# Patient Record
Sex: MALE | Race: OTHER | HISPANIC OR LATINO | URBAN - METROPOLITAN AREA
[De-identification: names, ages, dates, MRNs, and addresses within clinical notes are randomized per-mention and may not be internally consistent; named-entity substitution may affect disease eponyms.]

---

## 2021-07-06 ENCOUNTER — EMERGENCY (EMERGENCY)
Facility: HOSPITAL | Age: 41
LOS: 1 days | Discharge: HOME | End: 2021-07-06
Attending: EMERGENCY MEDICINE
Payer: MEDICAID

## 2021-07-06 VITALS
HEART RATE: 78 BPM | RESPIRATION RATE: 21 BRPM | TEMPERATURE: 98 F | DIASTOLIC BLOOD PRESSURE: 70 MMHG | SYSTOLIC BLOOD PRESSURE: 103 MMHG | OXYGEN SATURATION: 98 %

## 2021-07-06 VITALS
SYSTOLIC BLOOD PRESSURE: 99 MMHG | WEIGHT: 162.92 LBS | TEMPERATURE: 98 F | DIASTOLIC BLOOD PRESSURE: 63 MMHG | HEIGHT: 62.99 IN | OXYGEN SATURATION: 96 % | RESPIRATION RATE: 20 BRPM | HEART RATE: 91 BPM

## 2021-07-06 LAB
ALBUMIN SERPL ELPH-MCNC: 3.2 G/DL — LOW (ref 3.5–5)
ALP SERPL-CCNC: 98 U/L — SIGNIFICANT CHANGE UP (ref 40–120)
ALT FLD-CCNC: 79 U/L DA — HIGH (ref 10–60)
ANION GAP SERPL CALC-SCNC: 8 MMOL/L — SIGNIFICANT CHANGE UP (ref 5–17)
AST SERPL-CCNC: 33 U/L — SIGNIFICANT CHANGE UP (ref 10–40)
BASOPHILS # BLD AUTO: 0 K/UL — SIGNIFICANT CHANGE UP (ref 0–0.2)
BASOPHILS NFR BLD AUTO: 0 % — SIGNIFICANT CHANGE UP (ref 0–2)
BILIRUB SERPL-MCNC: 0.5 MG/DL — SIGNIFICANT CHANGE UP (ref 0.2–1.2)
BUN SERPL-MCNC: 14 MG/DL — SIGNIFICANT CHANGE UP (ref 7–18)
CALCIUM SERPL-MCNC: 8.8 MG/DL — SIGNIFICANT CHANGE UP (ref 8.4–10.5)
CHLORIDE SERPL-SCNC: 106 MMOL/L — SIGNIFICANT CHANGE UP (ref 96–108)
CO2 SERPL-SCNC: 28 MMOL/L — SIGNIFICANT CHANGE UP (ref 22–31)
CREAT SERPL-MCNC: 0.87 MG/DL — SIGNIFICANT CHANGE UP (ref 0.5–1.3)
D DIMER BLD IA.RAPID-MCNC: <150 NG/ML DDU — SIGNIFICANT CHANGE UP
EOSINOPHIL # BLD AUTO: 0.14 K/UL — SIGNIFICANT CHANGE UP (ref 0–0.5)
EOSINOPHIL NFR BLD AUTO: 2 % — SIGNIFICANT CHANGE UP (ref 0–6)
GLUCOSE SERPL-MCNC: 112 MG/DL — HIGH (ref 70–99)
HCT VFR BLD CALC: 41.4 % — SIGNIFICANT CHANGE UP (ref 39–50)
HGB BLD-MCNC: 13.7 G/DL — SIGNIFICANT CHANGE UP (ref 13–17)
LYMPHOCYTES # BLD AUTO: 2.24 K/UL — SIGNIFICANT CHANGE UP (ref 1–3.3)
LYMPHOCYTES # BLD AUTO: 33 % — SIGNIFICANT CHANGE UP (ref 13–44)
MCHC RBC-ENTMCNC: 28.9 PG — SIGNIFICANT CHANGE UP (ref 27–34)
MCHC RBC-ENTMCNC: 33.1 GM/DL — SIGNIFICANT CHANGE UP (ref 32–36)
MCV RBC AUTO: 87.3 FL — SIGNIFICANT CHANGE UP (ref 80–100)
MONOCYTES # BLD AUTO: 0.61 K/UL — SIGNIFICANT CHANGE UP (ref 0–0.9)
MONOCYTES NFR BLD AUTO: 9 % — SIGNIFICANT CHANGE UP (ref 2–14)
NEUTROPHILS # BLD AUTO: 2.51 K/UL — SIGNIFICANT CHANGE UP (ref 1.8–7.4)
NEUTROPHILS NFR BLD AUTO: 37 % — LOW (ref 43–77)
PLATELET # BLD AUTO: 448 K/UL — HIGH (ref 150–400)
POTASSIUM SERPL-MCNC: 3.9 MMOL/L — SIGNIFICANT CHANGE UP (ref 3.5–5.3)
POTASSIUM SERPL-SCNC: 3.9 MMOL/L — SIGNIFICANT CHANGE UP (ref 3.5–5.3)
PROT SERPL-MCNC: 7.2 G/DL — SIGNIFICANT CHANGE UP (ref 6–8.3)
RBC # BLD: 4.74 M/UL — SIGNIFICANT CHANGE UP (ref 4.2–5.8)
RBC # FLD: 12.8 % — SIGNIFICANT CHANGE UP (ref 10.3–14.5)
SARS-COV-2 RNA SPEC QL NAA+PROBE: DETECTED
SODIUM SERPL-SCNC: 142 MMOL/L — SIGNIFICANT CHANGE UP (ref 135–145)
TROPONIN I SERPL-MCNC: <0.015 NG/ML — SIGNIFICANT CHANGE UP (ref 0–0.04)
WBC # BLD: 6.78 K/UL — SIGNIFICANT CHANGE UP (ref 3.8–10.5)
WBC # FLD AUTO: 6.78 K/UL — SIGNIFICANT CHANGE UP (ref 3.8–10.5)

## 2021-07-06 PROCEDURE — 94640 AIRWAY INHALATION TREATMENT: CPT

## 2021-07-06 PROCEDURE — 80053 COMPREHEN METABOLIC PANEL: CPT

## 2021-07-06 PROCEDURE — 84484 ASSAY OF TROPONIN QUANT: CPT

## 2021-07-06 PROCEDURE — 87635 SARS-COV-2 COVID-19 AMP PRB: CPT

## 2021-07-06 PROCEDURE — 85025 COMPLETE CBC W/AUTO DIFF WBC: CPT

## 2021-07-06 PROCEDURE — 93005 ELECTROCARDIOGRAM TRACING: CPT

## 2021-07-06 PROCEDURE — 99285 EMERGENCY DEPT VISIT HI MDM: CPT

## 2021-07-06 PROCEDURE — 71046 X-RAY EXAM CHEST 2 VIEWS: CPT | Mod: 26

## 2021-07-06 PROCEDURE — 85379 FIBRIN DEGRADATION QUANT: CPT

## 2021-07-06 PROCEDURE — 36415 COLL VENOUS BLD VENIPUNCTURE: CPT

## 2021-07-06 PROCEDURE — 99283 EMERGENCY DEPT VISIT LOW MDM: CPT | Mod: 25

## 2021-07-06 PROCEDURE — 71046 X-RAY EXAM CHEST 2 VIEWS: CPT

## 2021-07-06 RX ORDER — IPRATROPIUM/ALBUTEROL SULFATE 18-103MCG
3 AEROSOL WITH ADAPTER (GRAM) INHALATION ONCE
Refills: 0 | Status: COMPLETED | OUTPATIENT
Start: 2021-07-06 | End: 2021-07-06

## 2021-07-06 RX ORDER — ALBUTEROL 90 UG/1
2 AEROSOL, METERED ORAL
Qty: 1 | Refills: 0
Start: 2021-07-06

## 2021-07-06 RX ADMIN — Medication 3 MILLILITER(S): at 21:29

## 2021-07-06 NOTE — ED PROVIDER NOTE - PATIENT PORTAL LINK FT
You can access the FollowMyHealth Patient Portal offered by [facility] by registering at the following website: [email or web address]. By joining [employer]’s FollowMyHealth portal, you will also be able to view your health information using other applications (apps) compatible with our system.

## 2021-07-06 NOTE — ED PROVIDER NOTE - NSFOLLOWUPINSTRUCTIONS_ED_ALL_ED_FT
Log Out.      [employer] CareNotes®     :  [facility]  	                       DYSPNEA - AfterCare(R) Instructions(ER/ED)           Dyspnea    WHAT YOU NEED TO KNOW:    Dyspnea is breathing difficulty or discomfort. You may have labored, painful, or shallow breathing. You may feel breathless or short of breath. Dyspnea can occur during rest or with activity. You may have dyspnea for a short time, or it might become chronic. Dyspnea is often a symptom of a disease or condition.    DISCHARGE INSTRUCTIONS:    Return to the emergency department if:   •Your signs and symptoms are the same or worse within 24 hours of treatment.       •You have shaking chills or a fever over 102°F.       •You have new pain, pressure, or tightness in your chest.       •You have a new or worse cough or wheezing, or you cough up blood.      •You feel like you cannot get enough air.      •The skin over your ribs or on your neck sinks in when you breathe.       •You have a severe headache with vomiting and abdominal pain.       •You feel confused or dizzy.      Contact your healthcare provider or specialist if:   •You have questions or concerns about your condition or care.          Medicines:    •Medicines may be used to treat the cause of your dyspnea. Medicines may reduce swelling in your airway or decrease extra fluid from around your heart or lungs. Other medicines may be used to decrease anxiety and help you feel calm and relaxed.      •Take your medicine as directed. Contact your healthcare provider if you think your medicine is not helping or if you have side effects. Tell him or her if you are allergic to any medicine. Keep a list of the medicines, vitamins, and herbs you take. Include the amounts, and when and why you take them. Bring the list or the pill bottles to follow-up visits. Carry your medicine list with you in case of an emergency.      Manage long-term dyspnea:   •Create an action plan. You and your healthcare provider can work together to create a plan for how to handle episodes of dyspnea. The plan can include daily activities, treatment changes, and what to do if you have severe breathing problems.      •Lean forward on your elbows when you sit. This helps your lungs expand and may make it easier to breathe.      •Use pursed-lip breathing any time you feel short of breath. Breathe in through your nose and then slowly breathe out through your mouth with your lips slightly puckered. It should take you twice as long to breathe out as it did to breathe in.  Breathe in Breathe out           •Do not smoke. Nicotine and other chemicals in cigarettes and cigars can cause lung damage and make it harder to breathe. Ask your healthcare provider for information if you currently smoke and need help to quit. E-cigarettes or smokeless tobacco still contain nicotine. Talk to your healthcare provider before you use these products.       •Reach or maintain a healthy weight. Your healthcare provider can help you create a safe weight loss plan if you are overweight.      •Exercise as directed. Exercise can help your lungs work more easily. Exercise can also help you lose weight if needed. Try to get at least 30 minutes of exercise most days of the week. Your healthcare provider can help you create an exercise plan that is safe for you.      Follow up with your healthcare provider or specialist as directed: Write down your questions so you remember to ask them during your visits.       © Copyright [employer] 2021           back to top                          © Copyright [employer] 2021

## 2021-07-06 NOTE — ED PROVIDER NOTE - RESPIRATORY, MLM
Breath sounds: mild diffuse wheezing, equal and good air movement bilaterally with no respiratory distress, speaking in full sentences.

## 2021-07-06 NOTE — ED PROVIDER NOTE - OBJECTIVE STATEMENT
39 y/o man, no PMH except he tested positive for COVID approximately 6/22/21, then tested negative on Rapid COVID test 6/29/21 at [facility], but since then c/o shortness of breath, chest pain, fatigue.  Cough has improved and fever has resolved.  He denies hemoptysis.  Denies h/o smoking/alcohol/drug use.

## 2021-07-06 NOTE — ED ADULT NURSE NOTE - OBJECTIVE STATEMENT
intermittent chest pain ans sob as per pt ever since he had positive covid test , pt appears comfortable in the stretcher o2 sat is 97

## 2021-07-06 NOTE — ED PROVIDER NOTE - CLINICAL SUMMARY MEDICAL DECISION MAKING FREE TEXT BOX
41 y/o man, no PMH except he tested positive for COVID approximately 6/22/21, then tested negative on Rapid COVID test 6/29/21 at [facility], but since then c/o shortness of breath, chest pain, fatigue; pulse ox is good and no respiratory distress on room air--labs, EKG, CXR, reassess.

## 2021-10-19 ENCOUNTER — EMERGENCY (EMERGENCY)
Facility: HOSPITAL | Age: 41
LOS: 1 days | Discharge: HOME | End: 2021-10-19
Attending: EMERGENCY MEDICINE
Payer: MEDICAID

## 2021-10-19 VITALS
RESPIRATION RATE: 18 BRPM | HEART RATE: 72 BPM | SYSTOLIC BLOOD PRESSURE: 111 MMHG | DIASTOLIC BLOOD PRESSURE: 75 MMHG | TEMPERATURE: 98 F | HEIGHT: 62.99 IN | OXYGEN SATURATION: 97 % | WEIGHT: 143.08 LBS

## 2021-10-19 PROBLEM — Z78.9 OTHER SPECIFIED HEALTH STATUS: Chronic | Status: ACTIVE | Noted: 2021-07-07

## 2021-10-19 PROCEDURE — 99283 EMERGENCY DEPT VISIT LOW MDM: CPT

## 2021-10-19 RX ORDER — FLUORESCEIN SODIUM 9 MG
1 STRIP OPHTHALMIC (EYE) ONCE
Refills: 0 | Status: COMPLETED | OUTPATIENT
Start: 2021-10-19 | End: 2021-10-19

## 2021-10-19 RX ORDER — POLYMYXIN B SULF/TRIMETHOPRIM 10000-1/ML
1 DROPS OPHTHALMIC (EYE) ONCE
Refills: 0 | Status: COMPLETED | OUTPATIENT
Start: 2021-10-19 | End: 2021-10-19

## 2021-10-19 RX ORDER — IBUPROFEN 200 MG
600 TABLET ORAL ONCE
Refills: 0 | Status: COMPLETED | OUTPATIENT
Start: 2021-10-19 | End: 2021-10-19

## 2021-10-19 RX ORDER — IBUPROFEN 200 MG
1 TABLET ORAL
Qty: 40 | Refills: 0
Start: 2021-10-19 | End: 2021-10-28

## 2021-10-19 RX ORDER — HYDROCORTISONE 1 %
1 OINTMENT (GRAM) TOPICAL
Qty: 15 | Refills: 0
Start: 2021-10-19 | End: 2021-10-23

## 2021-10-19 RX ADMIN — Medication 1 DROP(S): at 04:45

## 2021-10-19 RX ADMIN — Medication 1 DROP(S): at 05:08

## 2021-10-19 RX ADMIN — Medication 1 APPLICATION(S): at 04:45

## 2021-10-19 RX ADMIN — Medication 600 MILLIGRAM(S): at 05:21

## 2021-10-19 RX ADMIN — Medication 600 MILLIGRAM(S): at 04:51

## 2021-10-19 NOTE — ED ADULT TRIAGE NOTE - CHIEF COMPLAINT QUOTE
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  I have pain in my eyes and have difficulty opening them.  I do not know if I have any vision problem because I cannot open my eyes.

## 2021-10-19 NOTE — ED PROVIDER NOTE - CONTEXT
using light w/o outer glass encasement/work related using light w/o outer glass encasement/known (describe)/work related

## 2021-10-19 NOTE — ED PROVIDER NOTE - OBJECTIVE STATEMENT
42 y/o M, w/ no significant PMHx, presents w/ bilateral eye pain. Patient reports while at work he was using a light w/o outer glass encasement. Patient reports some eye irration on Saturday after work and states he also worked w/ this light from 9:00 AM-5:00 PM yesterday. Patient states he started having severe burning sensation in his eyes, redness, and sensitivity to light. Patient states burning is now so severe he cannot keep his eyes open thus he came to ED. Patient denies any chemical use or direct trauma to eyes. Evaluation was performed in [place]. Patient denies any other complaints. NKDA.

## 2021-10-19 NOTE — ED PROVIDER NOTE - PATIENT PORTAL LINK FT
You can access the FollowMyHealth Patient Portal offered by [facility] by registering at the following website: [email or web address]. By joining [employer]’s FollowMyHealth portal, you will also be able to view your health information using other applications (apps) compatible with our system.

## 2021-10-19 NOTE — ED PROVIDER NOTE - CLINICAL SUMMARY MEDICAL DECISION MAKING FREE TEXT BOX
42 y/o M, w/ no significant PMHx, presents w/ bilateral eye pain. Exam consistent w/ photokeratitis. Given antibiotic eye drops. Patient stable for discharge to follow up w/ ophthalmology as outpatient.

## 2021-10-19 NOTE — ED ADULT NURSE NOTE - OBJECTIVE STATEMENT
pt presents to ED due to eye pain. 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  I have pain in my eyes and have difficulty opening them.  I do not know if I have any vision problem because I cannot open my eyes.". Denies chest pain, sob, nausea and vomiting.

## 2021-10-19 NOTE — ED PROVIDER NOTE - NSFOLLOWUPCLINICS_GEN_ALL_ED_FT
[facility]  Ophthalmology  [address] [place] [address], [facility] [address]  [place] [address]  Phone: [phone]  Fax:

## 2021-10-19 NOTE — ED PROVIDER NOTE - NSFOLLOWUPINSTRUCTIONS_ED_ALL_ED_FT
Apply antibiotic 1 antibiotic drop into each eye every 3 hours for 1 week. Take ibuprofen for pain every 6 hours.  Followup with eye specialist in 1-2 days for reevaluation.    Aplique antibiótico 1 gota de antibiótico en cada chan cada 3 horas clari 1 semana. [place] ibuprofeno para el dolor cada 6 horas.  Seguimiento con un oftalmólogo en 1-2 días para reevaluación.      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 La córnea es la capa ronny de tejido que cubre la parte frontal de de paz chan.    Anatomía del chan         INSTRUCCIONES SOBRE EL TERRY HOSPITALARIA:    Regrese a la adalberto de emergencias si:  •Usted tiene dolor intenso.      •[place] chan goterea michaela o pus.      •[place] visión empeora repentinamente.      Llame a de paz médico u oftalmólogo si:  •Usted tiene dolor después de 2 días de tratamiento.      •[place] visión no regresa a la normalidad.      •Usted tiene preguntas o inquietudes acerca de de paz condición o cuidado.      Medicamentos:Es posible que usted necesite alguno de los siguientes:  •Puede administrarsepodrían administrarse. Pregunte al médico cómo debe isabel yoon medicamento de forma cintron. Algunos medicamentos recetados para el dolor contienen acetaminofén. No tome otros medicamentos que contengan acetaminofén sin consultarlo con de paz médico. Demasiado acetaminofeno puede causar daño al hígado. Los medicamentos recetados para el dolor podrían causar estreñimiento. Pregunte a de paz médico kojo prevenir o tratar estreñimiento.      •Es posible que le administren gotas para los ojospuede administrarse para aliviar el dolor. Use las gotas meka kojo le indiquen.      •Los antibióticossirven para prevenir o tratar alexander infección en los ojos causada por bacterias. Puede que se administre en gotas oculares o ungüento.      •Los medicamentos ciclopléjicosdilatan la pupila y relajan los músculos del chan. [place] ayudará a disminuir el dolor.      •[place] abdiaziz medicamentos kojo se le haya indicado.Consulte con de paz médico si usted meka que de paz medicamento no le está ayudando o si presenta efectos secundarios. Infórmele si es alérgico a cualquier medicamento. Mantenga alexander lista actualizada de los medicamentos, las vitaminas y los productos herbales que allyssa. Incluya los siguientes datos de los medicamentos: cantidad, frecuencia y motivo de administración. Traiga con usted la lista o los envases de las píldoras a abdiaziz citas de seguimiento. Lleve la lista de los medicamentos con usted en diane de alexander emergencia.      Lágrimas artificiales y ungüento:Las lágrimas artificiales se utilizan para mantener de paz chan húmedo. El ungüento se utiliza para aliviar y proteger de paz chan. [place]oon disminuirá de paz dolor y ayudará a prevenir que de paz párpado se pegue a de paz chan. Úselo según las indicaciones.    Aplique un vendaje fresco y húmedoen el chan. Cubra el vendaje con alexander pequeña bolsa de hielo para disminuir el dolor. Úselo según las indicaciones.    Un parche o protector ocularprotegerá el chan mientras se krystle. Úselo clari el tiempo que le indique de paz médico.    Parche en el chan y protector         Prevenga otra quemadura corneal por exposición a la georges:  •Use lentes de sol y un sombrero cuando esté afuera.Revise abdiaziz gafas de sol para yusef si tiene alexander etiqueta que diga que bloquean la georges UV. Escoja gafas que protejan abdiaziz ojos lo más posible. No janae directamente al sol o a de paz alrededor. Utilice alexander gorra o un sombrero de borde ancho para proteger los ojos nolan solar.      •Use gafas de protección cuando use alexander cama de bronceado.[place] ayudará a disminuir la cantidad de georges UV que alcanza abdiaziz ojos mientras se broncea.      •Use el equipo de trabajo adecuado.Gafas protectoras y cascos ayudan a proteger abdiaziz ojos si usted trabaja con herramientas de soldar.      Acuda a abdiaziz consultas de control con de paz médico u oftalmólogo dentro de las 12 a 24 horas:Es posible que deba regresar a que le revisen el chan y la visión. Anote abdiaziz preguntas para que se acuerde de hacerlas clari abdiaziz visitas.

## 2023-02-04 NOTE — ED PROVIDER NOTE - WR ORDER DATE AND TIME 1
This is a 57M with history of HTN, Bipolar d/o (on monthly Haldol inj, next dose due on 2/15 as per patient), Hypogammaglobulinemia (on monthly IVIG inj, unclear on last dose), Asthma, Chronic Hyponatremia and ?DM2 (patient denies, not on medications for DM2) who presents to the hospital with complaints of a persistent L leg ulcer. Patient was recently at [facility] on 2/2/23 for worsening L leg swelling and was placed on ancef for likely cellulitis and was admitted. Soon after admission he left AMA as he wanted to smoke cigarettes and could not take being in the hospital. Currently said that he came back because the swelling is persistent, has the oral antibiotic he was discharged on (cephalexin 500mg QID) but unclear if he has been taking it. He reports swelling of the L leg with a chronic L leg ulcer/wound (states its been present for >35 years, reports a prior biopsy that was negative for cancer). Reports occasional clear discharge from his wound but no purulence. No pain/warmth associated with the wound. No fevers/chills/diaphoresis. No other acute complaints. Currently states that he is willing to stay in the hospital for treatment.     On arrival to the ED, his vitals were T 98, P 78, /89, RR 20, O2 sat 97% RA. His lab work was most notable for mild hyponatremia (improved from prior levels). He was given ancef 1g IVPB x1, HCTZ 25mg PO x1, and losartan 100mg x1. He was admitted to medicine for further management.  06-Jul-2021 18:36
